# Patient Record
(demographics unavailable — no encounter records)

---

## 2024-10-18 NOTE — HISTORY OF PRESENT ILLNESS
[FreeTextEntry1] : СЕРГЕЙ ANN DELFINO NOLAN Nov 23 1985  Language: English Date of First visit: 06/21/2024 Accompanied by: self Contact info: Referring Provider/PCP: Dr. San Fax: [phone]  CC/ Problem List: balanitis phimosis =============================================================================== FIRST VISIT / Summary: Very pleasant 38 year old M here for phimosis. He is scheduled to have circumcision July 16 2024 with another urologist, however feels phimosis is worsening. Has tried creams for about 2-3 months. Has been a year since PCP labs. Admits to eating fair amount of sweets though stopped soda. He will avoid sugar for the upcoming week. He is having pressure at head of penis, unable to retract foreskin, feels tight ring, has some redness and mild itchiness. Denies hematuria, dysuria or discharge  ------------------------------------------------------------------------------------------- INTERVAL VISITS: The patient's medications and allergies were reviewed and edited below. Dated  10/18/2024  He is s/p circumcision 6/27/2024 and having persistent symptoms - small red areas ventral glans.  ===============================================================================  PMH: denies Meds: denies All: denies FHx: no  malignancies SocHx: current on and off smoker, social Etoh, single, no children,   PSH: right arm 15 years ago  ROS: Review of Systems is as per HPI unless otherwise denoted below  =============================================================================== DATA: LABS (SELECTED):---------------------------------------------------------------------------------------------------   RADS:-------------------------------------------------------------------------------------------------------------------   PATHOLOGY/CYTOLOGY:-------------------------------------------------------------------------------------------   VOIDING STUDIES: ----------------------------------------------------------------------------------------------------   STONE STUDIES: (Analysis/LLSA)----------------------------------------------------------------------------------   PROCEDURES: -----------------------------------------------------------------------------------------------    =============================================================================== PHYSICAL EXAM:   FOCUSED: ---------------------------------------------------------------------------------------------------------------- 10/18/2024  chaperone declined Penis: Circumcised. Incision healed. Small erythematous / slightly scaly lesions approx 2-3mm in size ventral glans.  Meatus: appears normal  ======================================================================================= DISCUSSION: ======================================================================================= ASSESSMENT and PLAN  1. phimosis s/p circumcision - apply steroid for ventral glans lesions onlyd - RTC as needed  ======================================================================================= 3  Thank you for allowing me to assist in the care of your patient. Should you have any questions please do not hesitate to reach out to me.  Markos Canales MD [facility]  [place] Office: [address], [address] [place] [address] T: [phone] F: [phone]  [place] Office: [address], [address] [place] [address] T: [phone] F: [phone]

## 2024-10-18 NOTE — HISTORY OF PRESENT ILLNESS
[FreeTextEntry1] : СЕРГЕЙ ANN DELFINO NOLAN Nov 23 1985  Language: English Date of First visit: 06/21/2024 Accompanied by: self Contact info: Referring Provider/PCP: Dr. San Fax: [phone]  CC/ Problem List: balanitis phimosis =============================================================================== FIRST VISIT / Summary: Very pleasant 38 year old M here for phimosis. He is scheduled to have circumcision July 16 2024 with another urologist, however feels phimosis is worsening. Has tried creams for about 2-3 months. Has been a year since PCP labs. Admits to eating fair amount of sweets though stopped soda. He will avoid sugar for the upcoming week. He is having pressure at head of penis, unable to retract foreskin, feels tight ring, has some redness and mild itchiness. Denies hematuria, dysuria or discharge  ------------------------------------------------------------------------------------------- INTERVAL VISITS: The patient's medications and allergies were reviewed and edited below. Dated  10/18/2024  He is s/p circumcision 6/27/2024 and having persistent symptoms - small red areas ventral glans.  ===============================================================================  PMH: denies Meds: denies All: denies FHx: no  malignancies SocHx: current on and off smoker, social Etoh, single, no children,   PSH: right arm 15 years ago  ROS: Review of Systems is as per HPI unless otherwise denoted below  =============================================================================== DATA: LABS (SELECTED):---------------------------------------------------------------------------------------------------   RADS:-------------------------------------------------------------------------------------------------------------------   PATHOLOGY/CYTOLOGY:-------------------------------------------------------------------------------------------   VOIDING STUDIES: ----------------------------------------------------------------------------------------------------   STONE STUDIES: (Analysis/LLSA)----------------------------------------------------------------------------------   PROCEDURES: -----------------------------------------------------------------------------------------------    =============================================================================== PHYSICAL EXAM:   FOCUSED: ---------------------------------------------------------------------------------------------------------------- 10/18/2024  chaperone declined Penis: Circumcised. Incision healed. Small erythematous / slightly scaly lesions approx 2-3mm in size ventral glans.  Meatus: appears normal  ======================================================================================= DISCUSSION: ======================================================================================= ASSESSMENT and PLAN  1. phimosis s/p circumcision - apply steroid for ventral glans lesions onlyd - RTC as needed  ======================================================================================= 3  Thank you for allowing me to assist in the care of your patient. Should you have any questions please do not hesitate to reach out to me.  Markos Canales MD [facility]  [place] Office: [address], [address] [place] [address] T: [phone] F: [phone]  [place] Office: [address] [place] [address] T: [phone] F: [phone]